# Patient Record
Sex: MALE | Race: WHITE | NOT HISPANIC OR LATINO | RURAL
[De-identification: names, ages, dates, MRNs, and addresses within clinical notes are randomized per-mention and may not be internally consistent; named-entity substitution may affect disease eponyms.]

---

## 2023-02-23 NOTE — PROGRESS NOTES
Subjective:       Patient ID: Moris Mederos is a 82 y.o. male.    Chief Complaint:  No chief complaint on file.      History of Present Illness  This pleasant 82 year old  male presents to the clinic with his two daughters as a new patient referral from Dr. Fabian for shuffling gait and confusion. Patient has had multiple CT of the head and the most recent CT of the head in January 2023 showed a left thalamic lacunar infarct which can be subacute. He has not completed the stroke work up and needs to have the MRI of the brain along with the MRA COW and carotids. He does have a pacemaker and a card. He did have a recent Echo with his cardiologist Dr. Alcantara and we will request a copy for review. He is currently on duel therapy of ASA 81 mg and Xarelto 15 mg per cardiology. He is also on triple therapy of ASA, Lipitor, and blood pressure medications. I discussed stroke risk modifications in detail as outlined in the plan. They feel like the shuffling gait is do to other causes besides Parkinson's Disease. The family does not feel like this is Parkinson as he does not exhibit any other symptoms of Parkinson's such as no bradykinesia, no stoop posture, no tremors, no cogwheeling or mask like facial appearance.  They will consider the MAXI scan in the future when ready. They do report repeated falls starting with the one December 14, 2021 in which he had a head injury. They report the last fall was in November 2022 and that he had 4 falls in 2022. They report he feels like his legs just give out. He did have a recent Knee scope. I discussed fall precautions to include having any head injury medically evaluated due to the blood thinners. He desires to wait on the physical therapy for now. His memory problems started in December 2021 after the fall. His MMSE score today is 25/30. She states that he sometimes has difficulty answering questions, easily distracted, and sometimes forgets what he is doing. I discussed the  memory work up including Neuropsychology testing and they are agreeable. His PMH is extensive and includes Pacemaker, DM, HTN, CHF, CKD, CAD, Afib, BRITTANI, gout, CABG, memory impairment, gait difficulty, and falls. He denies smoking or drinking alcohol. Discussed the plan in detail with the patient and daughters and they are in agreement with the plan. All their questions were answered at today's visit.      CT of the head without contrast done on January 2, 2023 showed no acute intracranial process. Left thalamic lacunar infarct which can be subacute. Ischemic changes and cerebral atrophy.       Review of Systems  Review of Systems   Constitutional:  Negative for activity change, diaphoresis, fever and unexpected weight change.   HENT:  Negative for congestion, ear pain, facial swelling, hearing loss, tinnitus, trouble swallowing and voice change.    Eyes:  Negative for photophobia, pain and visual disturbance.   Respiratory:  Negative for chest tightness, shortness of breath and wheezing.    Cardiovascular:  Negative for chest pain, palpitations and leg swelling.   Gastrointestinal:  Negative for constipation, diarrhea, nausea and vomiting.   Genitourinary:  Negative for difficulty urinating.   Musculoskeletal:  Positive for gait problem. Negative for back pain, neck pain and neck stiffness.   Skin:  Negative for color change, pallor, rash and wound.   Neurological:  Negative for dizziness, tremors, seizures, syncope, facial asymmetry, speech difficulty, weakness, light-headedness, numbness and headaches.   Psychiatric/Behavioral:  Negative for agitation, behavioral problems, confusion and hallucinations. The patient is not nervous/anxious and is not hyperactive.         Memory      Objective:      Neurologic Exam     Mental Status   Oriented to person, place, and time.   Oriented to person.   Oriented to place.   Oriented to time.   Registration: recalls 3 of 3 objects. Recall of objects at 5 minutes: recalls 0 of 3  objects. Follows 3 step commands.   Attention: decreased. Concentration: decreased.   Speech: speech is normal   Level of consciousness: alert  Knowledge: good.     MMSE score 25/30      Cranial Nerves     CN II   Visual fields full to confrontation.     CN III, IV, VI   Pupils are equal, round, and reactive to light.  Extraocular motions are normal.   Right pupil: Size: 3 mm. Shape: regular. Reactivity: brisk.   Left pupil: Size: 3 mm. Shape: regular. Reactivity: brisk.     CN V   Facial sensation intact.     CN VII   Facial expression full, symmetric.     CN VIII   CN VIII normal.   Hearing: intact    CN IX, X   CN IX normal.   CN X normal.     CN XI   CN XI normal.     CN XII   CN XII normal.     Motor Exam   Muscle bulk: normal  Overall muscle tone: normal  Right arm pronator drift: absent  Left arm pronator drift: absent    Strength   Right deltoid: 5/5  Left deltoid: 5/5  Right biceps: 5/5  Left biceps: 5/5  Right triceps: 5/5  Left triceps: 5/5  Right quadriceps: 4/5  Left quadriceps: 4/5  Right hamstrin/5  Left hamstrin/5    Sensory Exam   Left leg light touch: decreased from knee  Right leg vibration: decreased from knee  Left leg vibration: decreased from knee  Proprioception normal.   Right leg pinprick: decreased from knee  Left leg pinprick: decreased from knee    Gait, Coordination, and Reflexes     Coordination   Romberg: positive  Finger to nose coordination: normal    Tremor   Resting tremor: absent  Intention tremor: absent  Action tremor: absent    Reflexes   Right brachioradialis: 2+  Left brachioradialis: 2+  Right biceps: 2+  Left biceps: 2+  Right triceps: 2+  Left triceps: 2+  Right patellar: 2+  Left patellar: 2+  Right achilles: 2+  Left achilles: 2+  Right : 4+  Left : 4+  Patient in wheelchair, gait not tested     Physical Exam  Constitutional:       General: He is not in acute distress.  HENT:      Head: Normocephalic.      Mouth/Throat:      Mouth: Mucous membranes are  moist.   Eyes:      Extraocular Movements: Extraocular movements intact and EOM normal.      Pupils: Pupils are equal, round, and reactive to light.   Cardiovascular:      Rate and Rhythm: Normal rate and regular rhythm.      Heart sounds: Normal heart sounds. No murmur heard.  Pulmonary:      Effort: Pulmonary effort is normal. No respiratory distress.      Breath sounds: Normal breath sounds. No wheezing, rhonchi or rales.   Musculoskeletal:         General: No swelling, tenderness, deformity or signs of injury. Normal range of motion.      Cervical back: Normal range of motion. No rigidity or tenderness.      Right lower leg: No edema.      Left lower leg: No edema.   Skin:     General: Skin is warm and dry.      Capillary Refill: Capillary refill takes less than 2 seconds.      Coloration: Skin is not jaundiced or pale.      Findings: No bruising, erythema, lesion or rash.   Neurological:      Mental Status: He is alert and oriented to person, place, and time.      Coordination: Romberg Test abnormal. Finger-Nose-Finger Test normal.      Deep Tendon Reflexes: Babinski sign absent on the right side. Babinski sign absent on the left side.      Reflex Scores:       Tricep reflexes are 2+ on the right side and 2+ on the left side.       Bicep reflexes are 2+ on the right side and 2+ on the left side.       Brachioradialis reflexes are 2+ on the right side and 2+ on the left side.       Patellar reflexes are 2+ on the right side and 2+ on the left side.       Achilles reflexes are 2+ on the right side and 2+ on the left side.  Psychiatric:         Mood and Affect: Mood normal.         Speech: Speech normal.         Behavior: Behavior normal. Behavior is cooperative.         Thought Content: Thought content normal.         Cognition and Memory: Memory is impaired.         Assessment:     Problem List Items Addressed This Visit          Neuro    Cerebrovascular accident (CVA)    Relevant Orders    Ammonia    Vitamin  B12 & Folate    CBC Auto Differential    Comprehensive Metabolic Panel    Sedimentation Rate    Syphilis Antibody with reflex to RPR    TSH    T4, Free    Vitamin D    Hemoglobin A1C    MRI Brain W WO Contrast    MRA Brain without contrast    MRA Neck with and without contrast    Memory impairment    Relevant Orders    MRI Brain W WO Contrast    Ambulatory referral/consult to Adult Neuropsychology       Other    Sleep apnea    Repeated falls    Relevant Orders    MRI Brain W WO Contrast    Abnormal gait - Primary    Relevant Medications    LORazepam (ATIVAN) 0.5 MG tablet    Other Relevant Orders    Ammonia    Vitamin B12 & Folate    CBC Auto Differential    Comprehensive Metabolic Panel    Sedimentation Rate    Syphilis Antibody with reflex to RPR    TSH    T4, Free    Vitamin D    Hemoglobin A1C    MRI Brain W WO Contrast     Other Visit Diagnoses       On long term drug therapy        Relevant Orders    Ammonia    Vitamin B12 & Folate    CBC Auto Differential    Comprehensive Metabolic Panel    Sedimentation Rate    Syphilis Antibody with reflex to RPR    TSH    T4, Free    Vitamin D    Hemoglobin A1C    Other cerebrovascular disease        Relevant Orders    MRA Brain without contrast    MRA Neck with and without contrast              Plan:     Stroke risk modifications: eat a good diet, exercise, and good sleep habits.  --Keep regular follow up appointments with primary care provider  --Take medications as prescribed  --Keep blood pressure and blood sugars under control  --Wear C-pap as prescribed   Continue duel therapy of ASA and Xarelto per cardiology   Continue triple therapy of ASA , Lipitor and blood pressure medications  MRI of the brain with and without contrast to evaluate stroke   MRA COW and MRA of the carotids to evaluate stroke   Regular follow up with Cardiologist   Get copy of echo   Referral to Neuropsychology for memory testing   MMSE score 25/30  Labs: dementia panel   Consider referral for  MAXI scan if needed   Patient desires not to start physical therapy at this time   Fall precautions -- have any head injury medically evaluated due to blood thinners  Rx Ativan 0.5 mg take one tablet 30 minutes before MRI may repeat x 1 must have a    Follow up with neurology in one month or sooner if needed

## 2023-02-28 ENCOUNTER — OFFICE VISIT (OUTPATIENT)
Dept: NEUROLOGY | Facility: CLINIC | Age: 83
End: 2023-02-28
Payer: MEDICARE

## 2023-02-28 VITALS
BODY MASS INDEX: 26.98 KG/M2 | HEIGHT: 68 IN | DIASTOLIC BLOOD PRESSURE: 60 MMHG | WEIGHT: 178 LBS | OXYGEN SATURATION: 95 % | HEART RATE: 61 BPM | SYSTOLIC BLOOD PRESSURE: 132 MMHG

## 2023-02-28 DIAGNOSIS — Z79.899 ON LONG TERM DRUG THERAPY: ICD-10-CM

## 2023-02-28 DIAGNOSIS — R29.6 REPEATED FALLS: ICD-10-CM

## 2023-02-28 DIAGNOSIS — I67.89 OTHER CEREBROVASCULAR DISEASE: ICD-10-CM

## 2023-02-28 DIAGNOSIS — R26.9 ABNORMAL GAIT: Primary | ICD-10-CM

## 2023-02-28 DIAGNOSIS — R41.3 MEMORY IMPAIRMENT: ICD-10-CM

## 2023-02-28 DIAGNOSIS — I63.9 CEREBROVASCULAR ACCIDENT (CVA), UNSPECIFIED MECHANISM: ICD-10-CM

## 2023-02-28 DIAGNOSIS — G47.30 SLEEP APNEA, UNSPECIFIED TYPE: ICD-10-CM

## 2023-02-28 PROBLEM — E78.5 HYPERLIPIDEMIA: Status: ACTIVE | Noted: 2023-02-28

## 2023-02-28 PROBLEM — R20.2 PARESTHESIA: Status: ACTIVE | Noted: 2023-02-28

## 2023-02-28 PROBLEM — M71.21 SYNOVIAL CYST OF RIGHT POPLITEAL SPACE: Status: ACTIVE | Noted: 2023-02-28

## 2023-02-28 PROBLEM — D63.1 ANEMIA OF CHRONIC RENAL FAILURE: Status: ACTIVE | Noted: 2023-02-28

## 2023-02-28 PROBLEM — F41.1 GENERALIZED ANXIETY DISORDER: Status: ACTIVE | Noted: 2023-02-28

## 2023-02-28 PROBLEM — N18.32 STAGE 3B CHRONIC KIDNEY DISEASE: Status: ACTIVE | Noted: 2023-02-28

## 2023-02-28 PROBLEM — I48.20 CHRONIC ATRIAL FIBRILLATION: Status: ACTIVE | Noted: 2023-02-28

## 2023-02-28 PROBLEM — M10.9 GOUT: Status: ACTIVE | Noted: 2023-02-28

## 2023-02-28 PROBLEM — G47.31 PRIMARY CENTRAL SLEEP APNEA: Status: ACTIVE | Noted: 2023-02-28

## 2023-02-28 PROBLEM — Z87.898 HISTORY OF CONFUSION: Status: ACTIVE | Noted: 2023-02-28

## 2023-02-28 PROBLEM — E11.65 HYPERGLYCEMIA DUE TO TYPE 2 DIABETES MELLITUS: Status: ACTIVE | Noted: 2023-02-28

## 2023-02-28 PROBLEM — I49.5 SICK SINUS SYNDROME: Status: ACTIVE | Noted: 2023-02-28

## 2023-02-28 PROBLEM — I13.0 HYPERTENSIVE HEART DISEASE WITH CONGESTIVE HEART FAILURE AND CHRONIC KIDNEY DISEASE: Status: ACTIVE | Noted: 2023-02-28

## 2023-02-28 PROBLEM — K62.5 RECTAL BLEEDING: Status: ACTIVE | Noted: 2023-02-28

## 2023-02-28 PROBLEM — G47.33 OBSTRUCTIVE SLEEP APNEA: Status: ACTIVE | Noted: 2023-02-28

## 2023-02-28 PROBLEM — I50.9 CONGESTIVE HEART FAILURE: Status: ACTIVE | Noted: 2023-02-28

## 2023-02-28 PROBLEM — R20.9 SKIN SENSATION DISTURBANCE: Status: ACTIVE | Noted: 2023-02-28

## 2023-02-28 PROBLEM — I50.43 ACUTE ON CHRONIC COMBINED SYSTOLIC AND DIASTOLIC HEART FAILURE: Status: ACTIVE | Noted: 2023-02-28

## 2023-02-28 PROBLEM — I25.5 ISCHEMIC CARDIOMYOPATHY: Status: ACTIVE | Noted: 2023-02-28

## 2023-02-28 PROBLEM — G60.9 IDIOPATHIC PERIPHERAL NEUROPATHY: Status: ACTIVE | Noted: 2023-02-28

## 2023-02-28 PROBLEM — R26.81 UNSTEADY GAIT: Status: ACTIVE | Noted: 2023-02-28

## 2023-02-28 PROBLEM — E66.9 OBESITY: Status: ACTIVE | Noted: 2023-02-28

## 2023-02-28 PROBLEM — E78.5 DYSLIPIDEMIA: Status: ACTIVE | Noted: 2023-02-28

## 2023-02-28 PROBLEM — I73.9 PERIPHERAL VASCULAR DISEASE: Status: ACTIVE | Noted: 2023-02-28

## 2023-02-28 PROBLEM — E11.21 DIABETIC RENAL DISEASE: Status: ACTIVE | Noted: 2023-02-28

## 2023-02-28 PROBLEM — E53.8 VITAMIN B12 DEFICIENCY (NON ANEMIC): Status: ACTIVE | Noted: 2023-02-28

## 2023-02-28 PROBLEM — N18.9 ANEMIA OF CHRONIC RENAL FAILURE: Status: ACTIVE | Noted: 2023-02-28

## 2023-02-28 PROBLEM — M25.561 PAIN IN RIGHT KNEE: Status: ACTIVE | Noted: 2023-02-28

## 2023-02-28 PROBLEM — I10 ESSENTIAL HYPERTENSION: Status: ACTIVE | Noted: 2023-02-28

## 2023-02-28 PROBLEM — Z99.81 OXYGEN DEPENDENT: Status: ACTIVE | Noted: 2023-02-28

## 2023-02-28 PROBLEM — E11.9 DIABETES MELLITUS: Status: ACTIVE | Noted: 2021-05-04

## 2023-02-28 PROBLEM — F32.9 MAJOR DEPRESSION, SINGLE EPISODE: Status: ACTIVE | Noted: 2023-02-28

## 2023-02-28 PROBLEM — J44.1 ACUTE EXACERBATION OF CHRONIC OBSTRUCTIVE AIRWAYS DISEASE: Status: ACTIVE | Noted: 2023-02-28

## 2023-02-28 PROBLEM — Z79.4 LONG TERM CURRENT USE OF INSULIN: Status: ACTIVE | Noted: 2023-02-28

## 2023-02-28 PROBLEM — Z95.0 CARDIAC PACEMAKER: Status: ACTIVE | Noted: 2023-02-28

## 2023-02-28 PROBLEM — E11.40 DIABETIC NEUROPATHY: Status: ACTIVE | Noted: 2023-02-28

## 2023-02-28 PROBLEM — I25.10 ATHEROSCLEROTIC HEART DISEASE OF NATIVE CORONARY ARTERY WITHOUT ANGINA PECTORIS: Status: ACTIVE | Noted: 2023-02-28

## 2023-02-28 PROBLEM — R41.82 ALTERED MENTAL STATUS: Status: ACTIVE | Noted: 2023-02-28

## 2023-02-28 PROBLEM — M19.90 ARTHRITIS: Status: ACTIVE | Noted: 2023-02-28

## 2023-02-28 PROCEDURE — 99204 OFFICE O/P NEW MOD 45 MIN: CPT | Mod: S$PBB,,, | Performed by: NURSE PRACTITIONER

## 2023-02-28 PROCEDURE — 99204 PR OFFICE/OUTPT VISIT, NEW, LEVL IV, 45-59 MIN: ICD-10-PCS | Mod: S$PBB,,, | Performed by: NURSE PRACTITIONER

## 2023-02-28 PROCEDURE — 99215 OFFICE O/P EST HI 40 MIN: CPT | Mod: PBBFAC | Performed by: NURSE PRACTITIONER

## 2023-02-28 RX ORDER — ATORVASTATIN CALCIUM 40 MG/1
TABLET, FILM COATED ORAL
COMMUNITY
Start: 2022-12-29

## 2023-02-28 RX ORDER — TRAMADOL HYDROCHLORIDE 50 MG/1
50 TABLET ORAL EVERY 12 HOURS PRN
COMMUNITY
Start: 2022-10-14 | End: 2023-02-28

## 2023-02-28 RX ORDER — AMOXICILLIN AND CLAVULANATE POTASSIUM 875; 125 MG/1; MG/1
TABLET, FILM COATED ORAL
COMMUNITY
Start: 2022-09-22 | End: 2023-02-28

## 2023-02-28 RX ORDER — COLCHICINE 0.6 MG/1
TABLET ORAL
COMMUNITY
Start: 2022-10-14

## 2023-02-28 RX ORDER — ISOSORBIDE MONONITRATE 60 MG/1
TABLET, EXTENDED RELEASE ORAL
COMMUNITY
Start: 2022-10-24

## 2023-02-28 RX ORDER — SERTRALINE HYDROCHLORIDE 100 MG/1
100 TABLET, FILM COATED ORAL
COMMUNITY
Start: 2023-02-13

## 2023-02-28 RX ORDER — ASPIRIN 81 MG/1
81 TABLET ORAL DAILY
COMMUNITY

## 2023-02-28 RX ORDER — VIT C/E/ZN/COPPR/LUTEIN/ZEAXAN 250MG-90MG
1000 CAPSULE ORAL DAILY
COMMUNITY

## 2023-02-28 RX ORDER — CEFUROXIME AXETIL 250 MG/1
250 TABLET ORAL EVERY 12 HOURS
COMMUNITY
Start: 2023-01-04 | End: 2023-02-28

## 2023-02-28 RX ORDER — RANOLAZINE 500 MG/1
TABLET, EXTENDED RELEASE ORAL
COMMUNITY
Start: 2022-11-10

## 2023-02-28 RX ORDER — RIVAROXABAN 15 MG/1
TABLET, FILM COATED ORAL
COMMUNITY
Start: 2022-11-11

## 2023-02-28 RX ORDER — HYDROCODONE BITARTRATE AND ACETAMINOPHEN 5; 325 MG/1; MG/1
TABLET ORAL
COMMUNITY
Start: 2022-12-06 | End: 2023-02-28

## 2023-02-28 RX ORDER — MULTIVITAMIN
1 TABLET ORAL DAILY
COMMUNITY

## 2023-02-28 RX ORDER — LORAZEPAM 0.5 MG/1
TABLET ORAL
Qty: 2 TABLET | Refills: 0 | Status: SHIPPED | OUTPATIENT
Start: 2023-02-28

## 2023-02-28 RX ORDER — NITROGLYCERIN 0.4 MG/1
0.4 TABLET SUBLINGUAL EVERY 5 MIN PRN
COMMUNITY

## 2023-02-28 RX ORDER — CALCIUM CARBONATE 300MG(750)
TABLET,CHEWABLE ORAL
COMMUNITY

## 2023-02-28 RX ORDER — POTASSIUM CHLORIDE 750 MG/1
TABLET, EXTENDED RELEASE ORAL
COMMUNITY
Start: 2023-01-17

## 2023-02-28 RX ORDER — LORATADINE 10 MG/1
10 TABLET ORAL
COMMUNITY
Start: 2022-09-22

## 2023-02-28 RX ORDER — FENOFIBRIC ACID 135 MG/1
CAPSULE, DELAYED RELEASE ORAL
COMMUNITY
Start: 2022-10-24

## 2023-02-28 RX ORDER — IBUPROFEN 100 MG/5ML
1000 SUSPENSION, ORAL (FINAL DOSE FORM) ORAL DAILY
COMMUNITY

## 2023-02-28 RX ORDER — INSULIN ASPART 100 [IU]/ML
INJECTION, SUSPENSION SUBCUTANEOUS
COMMUNITY
Start: 2022-12-26

## 2023-02-28 RX ORDER — CARVEDILOL 12.5 MG/1
TABLET ORAL
COMMUNITY
Start: 2023-02-08

## 2023-02-28 RX ORDER — BUMETANIDE 0.5 MG/1
0.5 TABLET ORAL 2 TIMES DAILY
COMMUNITY
Start: 2022-12-05

## 2023-02-28 RX ORDER — GABAPENTIN 100 MG/1
CAPSULE ORAL
COMMUNITY
Start: 2022-12-30

## 2023-02-28 RX ORDER — OMEPRAZOLE 20 MG/1
CAPSULE, DELAYED RELEASE ORAL
COMMUNITY
Start: 2023-02-01

## 2023-02-28 RX ORDER — VALACYCLOVIR HYDROCHLORIDE 1 G/1
TABLET, FILM COATED ORAL
COMMUNITY
Start: 2022-09-22 | End: 2023-02-28

## 2023-02-28 NOTE — PATIENT INSTRUCTIONS
Stroke risk modifications: eat a good diet, exercise, and good sleep habits.  --Keep regular follow up appointments with primary care provider  --Take medications as prescribed  --Keep blood pressure and blood sugars under control  --Wear C-pap as prescribed   Continue duel therapy of ASA and Xarelto per cardiology   Continue triple therapy of ASA , Lipitor and blood pressure medications  MRI of the brain with and without contrast to evaluate stroke   MRA COW and MRA of the carotids to evaluate stroke   Regular follow up with Cardiologist   Get copy of echo   Referral to Neuropsychology for memory testing   MMSE score 25/30  Labs: dementia panel   Consider referral for MAXI scan if needed   Patient desires not to start physical therapy at this time   Fall precautions -- have any head injury medically evaluated due to blood thinners  Rx Ativan 0.5 mg take one tablet 30 minutes before MRI may repeat x 1 must have a    Follow up with neurology in one month or sooner if needed

## 2023-03-03 ENCOUNTER — TELEPHONE (OUTPATIENT)
Dept: NEUROLOGY | Facility: CLINIC | Age: 83
End: 2023-03-03
Payer: MEDICARE

## 2023-03-03 NOTE — TELEPHONE ENCOUNTER
Pt wife voiced understanding and will call back with fax number. Pt wife states see pcp march 10  ----- Message from Siddharth Ballesteros NP sent at 3/2/2023  4:13 PM CST -----  Please let daughter know that his sed rate is slightly elevated, BUN and creatinine are elevated, TSH is elevated, ammonia level is slightly elevated, RBC, H&H are a little low and this suggest some anemia, other labs were good. They need to follow up with PCP for the abnormal labs, you may have to fax them to the PCP, thanks

## 2023-03-30 ENCOUNTER — TELEPHONE (OUTPATIENT)
Dept: NEUROLOGY | Facility: CLINIC | Age: 83
End: 2023-03-30
Payer: MEDICARE

## 2023-03-30 NOTE — TELEPHONE ENCOUNTER
Called pt and wife and gave them the information below from WILL Gonzalez NP. They v/u.          ----- Message from CYNDI Carvalho sent at 3/30/2023  9:18 AM CDT -----  MRA brain was not revealing.  MRI brain showed chronic microvascular changes and global atrophy.  Did show chronic small infarct in the right cerebellum and basal ganglia.  His gait difficulty is likely due to these stroke findings and probably not due to parkinson's.  He has appt with me on 4/18, plan to keep appointment and continue his current medications as directed.

## 2023-03-30 NOTE — TELEPHONE ENCOUNTER
See other telephone message from today.      ----- Message from CYNDI Carvalho sent at 3/30/2023  9:18 AM CDT -----  MRA brain was not revealing.  MRI brain showed chronic microvascular changes and global atrophy.  Did show chronic small infarct in the right cerebellum and basal ganglia.  His gait difficulty is likely due to these stroke findings and probably not due to parkinson's.  He has appt with me on 4/18, plan to keep appointment and continue his current medications as directed.

## 2023-05-30 ENCOUNTER — OFFICE VISIT (OUTPATIENT)
Dept: NEUROLOGY | Facility: CLINIC | Age: 83
End: 2023-05-30
Payer: MEDICARE

## 2023-05-30 VITALS
HEART RATE: 69 BPM | BODY MASS INDEX: 27.06 KG/M2 | DIASTOLIC BLOOD PRESSURE: 58 MMHG | SYSTOLIC BLOOD PRESSURE: 110 MMHG | HEIGHT: 68 IN | OXYGEN SATURATION: 98 % | RESPIRATION RATE: 18 BRPM

## 2023-05-30 DIAGNOSIS — I63.9 CEREBROVASCULAR ACCIDENT (CVA), UNSPECIFIED MECHANISM: Primary | ICD-10-CM

## 2023-05-30 DIAGNOSIS — R26.9 ABNORMAL GAIT: ICD-10-CM

## 2023-05-30 DIAGNOSIS — G47.30 SLEEP APNEA, UNSPECIFIED TYPE: ICD-10-CM

## 2023-05-30 DIAGNOSIS — R29.6 REPEATED FALLS: ICD-10-CM

## 2023-05-30 PROCEDURE — 99213 OFFICE O/P EST LOW 20 MIN: CPT | Mod: S$PBB,,, | Performed by: NURSE PRACTITIONER

## 2023-05-30 PROCEDURE — 99213 PR OFFICE/OUTPT VISIT, EST, LEVL III, 20-29 MIN: ICD-10-PCS | Mod: S$PBB,,, | Performed by: NURSE PRACTITIONER

## 2023-05-30 PROCEDURE — 99215 OFFICE O/P EST HI 40 MIN: CPT | Mod: PBBFAC | Performed by: NURSE PRACTITIONER

## 2023-05-30 NOTE — PROGRESS NOTES
Subjective:       Patient ID: Moris Mederos is a 83 y.o. male     Chief Complaint:  No chief complaint on file.       Allergies:  Patient has no known allergies.    Current Medications:    Outpatient Encounter Medications as of 5/30/2023   Medication Sig Dispense Refill    ascorbic acid, vitamin C, (VITAMIN C) 1000 MG tablet Take 1,000 mg by mouth once daily.      aspirin (ECOTRIN) 81 MG EC tablet Take 81 mg by mouth once daily.      atorvastatin (LIPITOR) 40 MG tablet       bumetanide (BUMEX) 0.5 MG Tab Take 0.5 mg by mouth 2 (two) times daily.      carvediloL (COREG) 12.5 MG tablet TAKE 1 TABLET BY MOUTH TWICE A DAY WITH FOOD FOR 90 DAYS      cholecalciferol, vitamin D3, (VITAMIN D3) 25 mcg (1,000 unit) capsule Take 1,000 Units by mouth once daily.      colchicine (COLCRYS) 0.6 mg tablet       cyanocobalamin, vitamin B-12, 1,000 mcg/mL Kit Inject as directed.      fenofibric acid (FIBRICOR) 135 mg CpDR       gabapentin (NEURONTIN) 100 MG capsule       isosorbide mononitrate (IMDUR) 60 MG 24 hr tablet       loratadine (CLARITIN) 10 mg tablet Take 10 mg by mouth.      LORazepam (ATIVAN) 0.5 MG tablet Take one tablet 30 minutes before MRI, may repeat x 1, must have a  2 tablet 0    magnesium oxide 400 mg magnesium Tab Take by mouth.      multivitamin (ONE DAILY MULTIVITAMIN) per tablet Take 1 tablet by mouth once daily.      nitroGLYCERIN (NITROSTAT) 0.4 MG SL tablet Place 0.4 mg under the tongue every 5 (five) minutes as needed for Chest pain.      NOVOLOG MIX 70-30 U-100 INSULN 100 unit/mL (70-30) Soln       omeprazole (PRILOSEC) 20 MG capsule TAKE 1 CAPSULE BY MOUTH EVERY DAY FOR 90 DAYS      potassium chloride (KLOR-CON) 10 MEQ TbSR TAKE 1 TABLET BY MOUTH WITH FOOD TWICE A DAY      ranolazine (RANEXA) 500 MG Tb12       sertraline (ZOLOFT) 100 MG tablet Take 100 mg by mouth.      XARELTO 15 mg Tab        No facility-administered encounter medications on file as of 5/30/2023.       History of Present  Illness  82 y/o male following in neurology for reported confusion and gait difficulty.    Initially seen by MARK ANTHONY Ballesteros in March of 2023.    Since that visit had MRI brain which indicated chronic infarcts right cerebellum, basal ganglia, and thamic.  Certainly could be cause of his symptoms.  MRA brain found negative.  There was question of parkinson's disease but his initial evaluation lacked other symptoms such as bradykinesia, no tremors or cogwheel rigidity.  They had declined PT.  Prior MMSE was 25/30, MRI did indicate chronic microvascular changes and global atrophy along with the CVAs could certainly explain this.  He was referred for neuropsyche testing.  They will see them around end of June per the wife.      Has history of pacemaker, with recent echo per Dr. Alcantara.  Is on duel therapy currently with ASA and xarelto 15mg along with lipitor, and HTN management.           Review of Systems  Review of Systems   Constitutional:  Negative for diaphoresis and fever.   HENT:  Negative for congestion, hearing loss and tinnitus.    Eyes:  Negative for blurred vision, double vision, photophobia, discharge and redness.   Respiratory:  Negative for cough and shortness of breath.    Cardiovascular:  Negative for chest pain.   Gastrointestinal:  Negative for abdominal pain, nausea and vomiting.   Musculoskeletal:  Positive for falls. Negative for back pain, joint pain, myalgias and neck pain.   Skin:  Negative for itching and rash.   Neurological:  Positive for sensory change and weakness. Negative for dizziness, tremors, speech change, focal weakness, seizures, loss of consciousness and headaches.   Psychiatric/Behavioral:  Positive for memory loss. Negative for depression and hallucinations. The patient does not have insomnia.    All other systems reviewed and are negative.   Objective:     NEUROLOGICAL EXAMINATION:     MENTAL STATUS   Oriented to person, place, and time.   Registration: recalls 2 of 3 objects. Recall  at 5 minutes: recalls 1 of 3 objects.   Attention: decreased. Concentration: decreased.   Speech: speech is normal   Level of consciousness: alert  Knowledge: good and consistent with education.   Normal comprehension.     CRANIAL NERVES     CN II   Visual fields full to confrontation.   Visual acuity: normal  Right visual field deficit: none  Left visual field deficit: none     CN III, IV, VI   Pupils are equal, round, and reactive to light.  Extraocular motions are normal.   Right pupil: Size: 3 mm. Shape: regular. Reactivity: brisk. Consensual response: intact. Accommodation: intact.   Left pupil: Size: 3 mm. Shape: regular. Reactivity: brisk. Consensual response: intact. Accommodation: intact.   CN III: no CN III palsy  CN VI: no CN VI palsy  Nystagmus: none   Diplopia: none  Upgaze: normal  Downgaze: normal  Conjugate gaze: present  Vestibulo-ocular reflex: present    CN V   Facial sensation intact.   Right facial sensation deficit: none  Left facial sensation deficit: none  Right corneal reflex: normal  Left corneal reflex: normal    CN VII   Facial expression full, symmetric.   Right facial weakness: none  Left facial weakness: none  Right taste: normal  Left taste: normal    CN VIII   CN VIII normal.   Hearing: intact    CN IX, X   CN IX normal.   CN X normal.   Palate: symmetric    CN XI   CN XI normal.   Right sternocleidomastoid strength: normal  Left sternocleidomastoid strength: normal  Right trapezius strength: normal  Left trapezius strength: normal    CN XII   CN XII normal.   Tongue: not atrophic  Fasciculations: absent  Tongue deviation: none    MOTOR EXAM   Muscle bulk: normal  Overall muscle tone: normal  Right arm tone: normal  Left arm tone: normal  Right arm pronator drift: absent  Left arm pronator drift: absent  Right leg tone: normal  Left leg tone: normal    Strength   Right neck flexion: 5/5  Left neck flexion: 5/5  Right neck extension: 5/5  Left neck extension: 5/5  Right deltoid:  5/5  Left deltoid: 5/5  Right biceps: 5/5  Left biceps: 5/5  Right triceps: 5/5  Left triceps: 5/5  Right wrist flexion: 5/5  Left wrist flexion: 5/5  Right wrist extension: 5/5  Left wrist extension: 5/5  Right interossei: 5/5  Left interossei: 5/5  Right iliopsoas: 5/5  Left iliopsoas: 4/5  Right quadriceps: 5/5  Left quadriceps: 4/5  Right hamstrin/5  Left hamstrin/5  Right anterior tibial: 5/5  Left anterior tibial: 4/5  Right posterior tibial: 5/5  Left posterior tibial: 4/5  Right gastroc: 5/5  Left gastroc: 4/5    REFLEXES     Reflexes   Right brachioradialis: 2+  Left brachioradialis: 2+  Right biceps: 2+  Left biceps: 2+  Right triceps: 2+  Left triceps: 2+  Right patellar: 2+  Left patellar: 2+  Right achilles: 2+  Left achilles: 2+  Right plantar: normal  Left plantar: normal  Right Phelan: absent  Left Phelan: absent  Right ankle clonus: absent  Left ankle clonus: absent  Right pendular knee jerk: absent  Left pendular knee jerk: absent    SENSORY EXAM   Light touch normal.   Right arm light touch: normal  Left arm light touch: normal  Right leg light touch: normal  Left leg light touch: normal  Vibration normal.   Right arm vibration: normal  Left arm vibration: normal  Right leg vibration: normal  Left leg vibration: normal  Proprioception normal.   Right arm proprioception: normal  Left arm proprioception: normal  Right leg proprioception: normal  Left leg proprioception: normal  Pinprick normal.   Right arm pinprick: normal  Left arm pinprick: normal  Right leg pinprick: normal  Left leg pinprick: normal  Graphesthesia: normal  Romberg: negative  Stereognosis: normal    GAIT AND COORDINATION     Gait  Gait: wide-based     Coordination   Finger to nose coordination: normal  Heel to shin coordination: abnormal  Tandem walking coordination: abnormal    Tremor   Resting tremor: absent  Intention tremor: absent  Action tremor: absent       Using wheelchair in clinic      Physical Exam  Vitals  and nursing note reviewed.   Constitutional:       Appearance: Normal appearance.   HENT:      Head: Normocephalic.   Eyes:      Extraocular Movements: EOM normal.      Pupils: Pupils are equal, round, and reactive to light.   Cardiovascular:      Rate and Rhythm: Normal rate and regular rhythm.      Pulses: Normal pulses.      Heart sounds: Normal heart sounds.   Pulmonary:      Effort: Pulmonary effort is normal.      Breath sounds: Normal breath sounds.   Musculoskeletal:         General: Normal range of motion.      Cervical back: Normal range of motion and neck supple.   Skin:     General: Skin is warm and dry.   Neurological:      General: No focal deficit present.      Mental Status: He is alert and oriented to person, place, and time.      Cranial Nerves: No cranial nerve deficit.      Sensory: No sensory deficit.      Motor: Weakness present.      Coordination: Coordination abnormal. Heel to Shin Test abnormal. Finger-Nose-Finger Test and Romberg Test normal.      Gait: Gait abnormal and tandem walk abnormal.      Deep Tendon Reflexes: Reflexes normal.      Reflex Scores:       Tricep reflexes are 2+ on the right side and 2+ on the left side.       Bicep reflexes are 2+ on the right side and 2+ on the left side.       Brachioradialis reflexes are 2+ on the right side and 2+ on the left side.       Patellar reflexes are 2+ on the right side and 2+ on the left side.       Achilles reflexes are 2+ on the right side and 2+ on the left side.  Psychiatric:         Mood and Affect: Mood normal.         Speech: Speech normal.         Behavior: Behavior normal.        Assessment:     Problem List Items Addressed This Visit    None       Primary Diagnosis and ICD10  No primary diagnosis found.    Plan:     There are no Patient Instructions on file for this visit.    There are no discontinued medications.    Requested Prescriptions      No prescriptions requested or ordered in this encounter       No orders of the  defined types were placed in this encounter.

## 2023-06-05 PROBLEM — I63.9 CEREBROVASCULAR ACCIDENT (CVA): Status: RESOLVED | Noted: 2023-02-28 | Resolved: 2023-06-05

## 2023-06-05 PROBLEM — K62.5 RECTAL BLEEDING: Status: RESOLVED | Noted: 2023-02-28 | Resolved: 2023-06-05
